# Patient Record
Sex: MALE | Race: ASIAN | NOT HISPANIC OR LATINO | URBAN - METROPOLITAN AREA
[De-identification: names, ages, dates, MRNs, and addresses within clinical notes are randomized per-mention and may not be internally consistent; named-entity substitution may affect disease eponyms.]

---
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
## 2023-01-12 NOTE — ED PROVIDER NOTE - NS ED ATTENDING STATEMENT MOD
1 I have personally seen and examined this patient.  I have fully participated in the care of this patient. I have reviewed all pertinent clinical information, including history, physical exam, plan and the Resident’s note and agree except as noted.

## 2025-02-28 NOTE — ED PEDIATRIC NURSE NOTE - NO SIGNIFICANT PAST SURGICAL HISTORY
Pt. Attended [facility] III [facility]. See flow sheet for details.    <<----- Click to add NO significant Past Surgical History